# Patient Record
(demographics unavailable — no encounter records)

---

## 2025-01-13 NOTE — RESULTS/DATA
[FreeTextEntry1] : Labs reviewed by me, notable for: - CBC with WBC 9.60, ANC 3.92; Hb 8.8, MCV 84.2l platelets 118k - Retic 6.7%, .5 - CMP/M/P within acceptable limits -   HLA typing by NGS sent on both Hasnat and parents, results pending

## 2025-01-13 NOTE — REASON FOR VISIT
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

## 2025-01-13 NOTE — SOCIAL HISTORY
[TextEntry] : Born in [facility] and lived there until April 2024, now lives in [place] [facility] with both parents. Does not attend school and is not receiving home instruction. Father previously worked in car manufacturing, now working for [employer] / deliveries given Hasnat's medical condition and need for frequent appointments. Mother not employed outside the home at this time

## 2025-01-13 NOTE — REVIEW OF SYSTEMS
[Negative] : Allergic/Immunologic [Immunizations are up to date by report] : Immunizations are up to date by report [FreeTextEntry2] : Osteopetrosis, short stature [FreeTextEntry3] : Decreased vision [FreeTextEntry4] : Left maxillary osteomyelitis [FreeTextEntry1] : Mild pancytopenia, hypersplenism [FreeTextEntry8] : Splenomegaly

## 2025-01-13 NOTE — HISTORY OF PRESENT ILLNESS
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 He is accompanied by bother parents for this visit. Visit conducted with in-person [facility] . Visit also held with Farrah Cuenca LCSW, and CAMILLE Amador.  Raúl was born in [facility], where he resided until April 2024. Parents report that Raúl was previously well until ~ 7 years of age, when father (who was living in the [place] at the time and communicated with Raúl over facetime) noticed that Raúl was developing dental abnormalities and seemed to have difficulty focusing on the phone screen. He was seen by several doctors in [facility], and ultimately diagnosed with osteopetrosis. He moved to the  ([place], [place]) in April 2024, at which time he established care at [facility]. Genetic testing performed in 8/2024 revealed homozygous mutations - classified as variants of uncertain significance - in TCIRG1 ([id number] A>G), thought to be likely pathogenic given patient's clinical presentation.   Raúl has the following sequelae of his underlying osteopetrosis diagnosis: - Mild pancytopenia with reticulocytosis - received 3-4 blood transfusions in [facility], has not required since arriving in the . 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 Most recent ultrasound (11/9/2024) with spleen size 15.4cm (estimated volume 655 cc) - Osteomyelitis of the left maxilla with overlying draining wound - parents report that they initially noticed left cheek swelling in February 2024, ultimately diagnosed with left maxillary osteomyelitis. He is s/p surgery at [facility], and has been on antibiotics (currently taking Augmentin) since May 2024. Parents report that overlying wound is still open with purulent drainage - Decreased vision - follows with ophthalmology at [facility] (no notes available for review at this time), parents report that most recent appointment was 5 months ago. Per parents, Raúl had brain MRI performed in [facility] but they do not have the imaging or reports from this - Dental complications - has had dental infections and required several tooth extractions as well as "bone removal" x2

## 2025-01-13 NOTE — FAMILY HISTORY
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

## 2025-01-13 NOTE — PHYSICAL EXAM
[de-identified] : Well-appearing boy, +short stature, conversational in Maltese with parents, no acute distress [de-identified] : PERRL, EOMI, conjunctiva/sclerae clear [de-identified] : +Decaying teeth, s/p extraction of several teeth [de-identified] : Supple, FROM [de-identified] : Breathing comfortably with no tachypnea, retractions, nasal flaring. Good air movement to the bases bilaterally, lungs clear to auscultation [de-identified] : RRR, normal S1/S2, no murmur. WWP, CR <2s [de-identified] : Soft, non-tender, +mildly distended. No hepatomegaly. +Splenomegaly, ~6cm below the costal margin [de-identified] : No cervical LAD [de-identified] : No deformity or tenderness to palpation [de-identified] : No rash, bruising, petechiae. Left cheek with ~1cm open wound, no surrounding erythema, +scant purulent drainage [de-identified] : Grossly intact